# Patient Record
(demographics unavailable — no encounter records)

---

## 2025-04-21 NOTE — DISCUSSION/SUMMARY
[FreeTextEntry1] : Anabel is a 35-year-old para 0 LMP/14/25 here for GYN annual  PCP: Dr. Good    #Well Woman Visit  1. Nutrition/Activity: The benefits of physical activity and balanced diet.  2. Health Screening: She was informed of the benefits of a screening Pap. - Cervix: We reviewed ASCCP/ACOG guidelines for pap smear screening. PAP collected today. 3. Sex Health: The importance of safe-sex practices was discussed with the patient. STD screening was offered to patient, she accepts g/c testing 4. Contraception: does not desire at this time. 5. Hx of pre-DM, discussed dietary lifestyle modifications to aid with glycemic control.  Repeat A1c today     #AUB - discussed etiologies of abnormal uterine bleeding including hormonal vs structural vs cytological - Discussed concern for endometrial hyperplasia given history of irregular cycles and BMI. - hormonal causes such as thyroid abnormalities, irregularities in menopausal hormones, and prolactin levels were reviewed - structural abnormalities including fibroids, polyps, and pre-cancerous lesions were reviewed. - discussed role of lab work for hormonal evaluation (TSH/PRL/A1c), she was agreeable, collected today - discussed role of TVUS for evaluation of structural causes, she was agreeable, ordered today   She verbalized understanding and agreement with above counseling regarding differential diagnosis, evaluation, and plan. She was given time for questions/concerns which were all answered to her apparent satisfaction.  All designated lab work for today drawn in office.   RTO after US/labs

## 2025-04-21 NOTE — PHYSICAL EXAM
[Chaperoned Physical Exam] : A chaperone was present in the examining room during all aspects of the physical examination. [MA] : MA [FreeTextEntry2] : NA [Appropriately responsive] : appropriately responsive [Alert] : alert [No Acute Distress] : no acute distress [Soft] : soft [Non-tender] : non-tender [No Lesions] : no lesions [No Mass] : no mass [Oriented x3] : oriented x3 [Examination Of The Breasts] : a normal appearance [No Masses] : no breast masses were palpable [Labia Majora] : normal [Labia Minora] : normal [Normal] : normal [Uterine Adnexae] : normal

## 2025-04-21 NOTE — HISTORY OF PRESENT ILLNESS
[N] : Patient denies prior pregnancies [Frequency: Q ___ days] : menstrual periods occur approximately every [unfilled] days [Menarche Age: ____] : age at menarche was [unfilled] [FreeTextEntry1] : Anabel is a 35-year-old para 0 LMP/14/25 here for GYN annual  Last seen by prior OB/GYN in 2022 for routine annual.  Reports history of irregular menses past several years.  Was last seen in 2022 for evaluation.  Workup was negative States that she went from December 2024 to April 2025 without a regular cycle.  Can sometimes skip cycles for few months, then cycle will return back to normal regularity.  Denies any new medication use, weight gain/weight loss or Not currently sexually active.  Denies any burning/pain with urination, denies malodorous urine, denies hematuria. Denies abnormal vaginal discharge (color, consistency, odor), denies vaginal itching/burning/pain/irritation. Denies any breast tenderness, lumps/masses, skin abnormalities, or abnormal discharge/bleeding from nipple.   Screening: - Pap (2022): NILM   ObHx: denies GynHx:  Denies hx of ovarian cysts, hx of fibroids, hx of endometriosis, hx of STD's PMH: Pre-dm PSH: Denies ALL: NKDA SocHx: Lives with family. Feels safe at home. Denies depression or anxiety related symptoms. Never used tobacco products, denies illicit drug use/EtOH. FamHx: Mother- Uterine cancer [PGHxFullTerm] : 0 [PGHxTotal] : 0 [PGHxPremature] : 0 [PGHxAbortions] : 0 [[facility]] : 0 [PGHxABInduced] : 0 [PGHxABSpont] : 0 [PGHxEctopic] : 0 [PGHxMultBirths] : 0

## 2025-05-19 NOTE — DISCUSSION/SUMMARY
[FreeTextEntry1] : Anabel is a 35-year-old para 0 LMP 4/14/25 here for testing/imaging follow up  PLAN: - A1c-6.5%.  Discussed that patient now meets criteria for diabetes.  Noticeable elevations since last A1c over the summer.  Discussed dietary and lifestyle modifications to aid with glycemic control.  Discussed follow-up with PCP to discuss initiation of oral/GLP-1 medications for control.  Discussed how diabetes would impact future pregnancy and risk factors associated with poor glycemic control in pregnancy -Pelvic ultrasound significant for 2 endometrial polyps.  Discussed finding and recommendations for treatment given irregular menses/AUB.  treatment options including in office versus hospital-based polypectomy discussed.  Patient unsure if she would like it performed in office or in the hospital setting.  Will think about it and call office with decision  She verbalized understanding and agreement with above counseling regarding differential diagnosis, evaluation, and plan. She was given time for questions/concerns which were all answered to her apparent satisfaction.